# Patient Record
Sex: MALE | Race: WHITE | NOT HISPANIC OR LATINO | Employment: OTHER | URBAN - METROPOLITAN AREA
[De-identification: names, ages, dates, MRNs, and addresses within clinical notes are randomized per-mention and may not be internally consistent; named-entity substitution may affect disease eponyms.]

---

## 2024-08-01 ENCOUNTER — OFFICE VISIT (OUTPATIENT)
Dept: URGENT CARE | Facility: URGENT CARE | Age: 59
End: 2024-08-01

## 2024-08-01 VITALS
OXYGEN SATURATION: 97 % | HEIGHT: 72 IN | WEIGHT: 220 LBS | HEART RATE: 98 BPM | TEMPERATURE: 97.4 F | DIASTOLIC BLOOD PRESSURE: 96 MMHG | SYSTOLIC BLOOD PRESSURE: 136 MMHG | BODY MASS INDEX: 29.8 KG/M2

## 2024-08-01 DIAGNOSIS — T16.2XXA FOREIGN BODY OF LEFT EAR, INITIAL ENCOUNTER: Primary | ICD-10-CM

## 2024-08-01 PROCEDURE — 99207 PR NO CHARGE LOS: CPT | Performed by: PHYSICIAN ASSISTANT

## 2024-08-01 PROCEDURE — 69200 CLEAR OUTER EAR CANAL: CPT | Performed by: PHYSICIAN ASSISTANT

## 2024-08-01 NOTE — PATIENT INSTRUCTIONS
Patient was educated on the natural course of condition. Q tip removed from left ear canal. Avoid using q tips. See your primary care provider if symptoms worsen or do not improve in 7 days. Seek emergency care if you develop severe ear pain, swelling, or redness.

## 2024-08-01 NOTE — PROGRESS NOTES
URGENT CARE VISIT:    SUBJECTIVE:   Jean Carlos Saul is a 59 year old male presenting with a chief complaint of q tip stuck in left ear.  Onset was today.  He denies the following symptoms: stuffy nose and cough - non-productive  Course of illness is same.    Treatment measures tried include None tried with no relief of symptoms.  Predisposing factors include None.    PMH: No past medical history on file.  Allergies: Penicillins   Medications:   No current outpatient medications on file.     Social History:   Social History     Tobacco Use    Smoking status: Never    Smokeless tobacco: Never   Substance Use Topics    Alcohol use: Not on file       ROS:  Review of systems negative except as stated above.    OBJECTIVE:  BP (!) 136/96   Pulse 98   Temp 97.4  F (36.3  C) (Temporal)   Ht 1.829 m (6')   Wt 99.8 kg (220 lb)   SpO2 97%   BMI 29.84 kg/m    GENERAL APPEARANCE: healthy, alert and no distress  EYES: EOMI,  PERRL, conjunctiva clear  HENT: ear canals and TM's normal.  Nose and mouth without ulcers, erythema or lesions  NECK: supple, nontender, no lymphadenopathy  RESP: lungs clear to auscultation - no rales, rhonchi or wheezes  CV: regular rates and rhythm, normal S1 S2, no murmur noted  SKIN: no suspicious lesions or rashes    Procedure:  Q tip in left ear canal removed with alligator forceps. Patient tolerated the procedure well.       ASSESSMENT:    ICD-10-CM    1. Foreign body of left ear, initial encounter  T16.2XXA REMOVE FOREIGN BODY SIMPLE          PLAN:  Patient Instructions   Patient was educated on the natural course of condition. Q tip removed from left ear canal. Avoid using q tips. See your primary care provider if symptoms worsen or do not improve in 7 days. Seek emergency care if you develop severe ear pain, swelling, or redness.    Patient verbalized understanding and is agreeable to plan. The patient was discharged ambulatory and in stable condition.    Rosi Cordoba PA-C  ....................  8/1/2024   2:09 PM